# Patient Record
Sex: MALE | Race: WHITE | NOT HISPANIC OR LATINO | URBAN - METROPOLITAN AREA
[De-identification: names, ages, dates, MRNs, and addresses within clinical notes are randomized per-mention and may not be internally consistent; named-entity substitution may affect disease eponyms.]

---

## 2022-03-14 ENCOUNTER — HISTORICAL (OUTPATIENT)
Dept: ADMINISTRATIVE | Facility: HOSPITAL | Age: 37
End: 2022-03-14

## 2022-03-24 ENCOUNTER — HISTORICAL (OUTPATIENT)
Dept: SURGERY | Facility: HOSPITAL | Age: 37
End: 2022-03-24

## 2022-05-02 ENCOUNTER — OFFICE VISIT (OUTPATIENT)
Dept: ORTHOPEDICS | Facility: CLINIC | Age: 37
End: 2022-05-02

## 2022-05-02 VITALS
SYSTOLIC BLOOD PRESSURE: 132 MMHG | HEART RATE: 84 BPM | HEIGHT: 78 IN | WEIGHT: 252 LBS | BODY MASS INDEX: 29.16 KG/M2 | DIASTOLIC BLOOD PRESSURE: 81 MMHG

## 2022-05-02 DIAGNOSIS — Z98.890 S/P ARTHROSCOPY OF RIGHT SHOULDER: Primary | ICD-10-CM

## 2022-05-02 PROCEDURE — 3079F PR MOST RECENT DIASTOLIC BLOOD PRESSURE 80-89 MM HG: ICD-10-PCS | Mod: CPTII,,, | Performed by: ORTHOPAEDIC SURGERY

## 2022-05-02 PROCEDURE — 99024 PR POST-OP FOLLOW-UP VISIT: ICD-10-PCS | Mod: ,,, | Performed by: ORTHOPAEDIC SURGERY

## 2022-05-02 PROCEDURE — 3079F DIAST BP 80-89 MM HG: CPT | Mod: CPTII,,, | Performed by: ORTHOPAEDIC SURGERY

## 2022-05-02 PROCEDURE — 3008F BODY MASS INDEX DOCD: CPT | Mod: CPTII,,, | Performed by: ORTHOPAEDIC SURGERY

## 2022-05-02 PROCEDURE — 3008F PR BODY MASS INDEX (BMI) DOCUMENTED: ICD-10-PCS | Mod: CPTII,,, | Performed by: ORTHOPAEDIC SURGERY

## 2022-05-02 PROCEDURE — 99024 POSTOP FOLLOW-UP VISIT: CPT | Mod: ,,, | Performed by: ORTHOPAEDIC SURGERY

## 2022-05-02 PROCEDURE — 3075F SYST BP GE 130 - 139MM HG: CPT | Mod: CPTII,,, | Performed by: ORTHOPAEDIC SURGERY

## 2022-05-02 PROCEDURE — 3075F PR MOST RECENT SYSTOLIC BLOOD PRESS GE 130-139MM HG: ICD-10-PCS | Mod: CPTII,,, | Performed by: ORTHOPAEDIC SURGERY

## 2022-05-02 PROCEDURE — 1159F MED LIST DOCD IN RCRD: CPT | Mod: CPTII,,, | Performed by: ORTHOPAEDIC SURGERY

## 2022-05-02 PROCEDURE — 1159F PR MEDICATION LIST DOCUMENTED IN MEDICAL RECORD: ICD-10-PCS | Mod: CPTII,,, | Performed by: ORTHOPAEDIC SURGERY

## 2022-05-02 NOTE — PROGRESS NOTES
Chief Complaint:   Chief Complaint   Patient presents with    Right Shoulder - Post-op Evaluation    Shoulder Pain     7wk f/u Right shoulder arthoscopic labral repair sx 03/24/22 GL 6/22/22       History of present illness:  3/24/22:  Right shoulder arthroscopic labral repair    He returns today. Pain is under good control. Working with formal therapy. Discontinued the sling.     Musculoskeletal:   Incisions healed. Forward flexion 170, abduction 90, external rotation 60.          Assessment: S/P arthroscopy of right shoulder        Plan:  Doing well status post above.  Continue formal therapy.  Follow up in 6 weeks for recheck.

## 2022-05-14 NOTE — OP NOTE
Patient:   Torrey Bright             MRN: [id number]            FIN: [id number]               Age:   36 years     Sex:  Male     :  1985   Associated Diagnoses:   None   Author:   Elijah Pedro MD, Justin BARBOZA      SURGEON: Justin Nava MD    PREOPERATIVE DIAGNOSIS: Right shoulder labral tear  POSTOPERATIVE DIAGNOSIS: Right shoulder labral tear    PROCEDURE PERFORMED:   1. Right shoulder arthroscopic labral repair    ASSISTANT: Ronda Mcfadden NP. Mrs. Mcfadden was essential in manipulating the arm, suture anchor placement, suture shuttling, and closure    ANESTHESIA: General plus regional    ESTIMATED BLOOD LOSS: Minimal.    COMPLICATIONS: None.    IMPLANTS:    1. Arthrex FiberTak Soft [place]    INDICATIONS FOR PROCEDURE:   Alden is a 36 y.o. male who has had ongoing right shoulder pain and apprehension. The patient was seen in the clinic and preoperative imaging has revealed a torn labrum. The patient has failed nonoperative treatment and has elected for operative intervention. Risks and benefits were thoroughly explained and consent was obtained prior to today's procedure.    DESCRIPTION OF PROCEDURE:   The patient was seen in the preoperative holding area where the history and physical were reviewed without change. The operative shoulder was marked, consents were reviewed, and any questions were answered for the patient. A regional blockade of the shoulder was performed by the Anesthesia Service. The patient was induced under general anesthesia. Next the patient was placed lateral with care taken to ensure the cervical spine was well positioned and the face, eyes and all bony prominences well protected. Preoperative time-out led by the surgeon was performed verifying the patient, procedure, and preoperative antibiotics. The right upper extremity was then prepped and draped in the usual sterile fashion and secured to an arm traction post.    I then began the procedure by starting using a standard posterior portal  in its using the trocar atraumatically into the glenohumeral joint. Diagnostic arthroscopy showed intact humeral head and intact glenoid cartilage.  He had a very small Hill-Sachs lesion.  He had mild impaction of the rotator cuff but overall it was intact.  The supraspinatus, infraspinatus, subscapularis were intact. The posterior labrum was intact. The superior labrum was mildly frayed and debrided with a shaver. The anterior and inferior labrum were torn.    I then created 2 anterior portals using a spinal needle and placed 2 shuttling cannulas anteriorly. I was then able to lift off the anterior inferior labrum using an elevator. I then stimulated the glenoid bone using a shaver. Once I had the labrum appropriately mobilized, I then began the repair. I placed 1 suture anchor at the most inferior aspect of the tear. I then used a suture passer in order to settle a one limb of the suture around the torn labrum. I then used the self locking device in order to reduce the labrum back down onto the face of the glenoid. This secured the labrum and a knotless technique. I then moved superiorly along the face the glenoid and placed 3 additional anchors using a similar technique. I then probed the repair, and it was nice and stable. Happy with this, I took my final arthroscopic photos. Arthroscopic fluid was drained from the joint. Incisions were closed in layers. A sterile dressing was placed. The patient was placed into an abduction pillow sling and awoken from anesthesia. The patient was transferred to the postoperative unit in good condition

## 2022-06-13 ENCOUNTER — OFFICE VISIT (OUTPATIENT)
Dept: ORTHOPEDICS | Facility: CLINIC | Age: 37
End: 2022-06-13
Payer: COMMERCIAL

## 2022-06-13 VITALS
DIASTOLIC BLOOD PRESSURE: 81 MMHG | BODY MASS INDEX: 29.16 KG/M2 | WEIGHT: 252 LBS | HEIGHT: 78 IN | SYSTOLIC BLOOD PRESSURE: 132 MMHG

## 2022-06-13 DIAGNOSIS — Z98.890 S/P ARTHROSCOPY OF RIGHT SHOULDER: Primary | ICD-10-CM

## 2022-06-13 PROCEDURE — 1159F MED LIST DOCD IN RCRD: CPT | Mod: CPTII,,, | Performed by: NURSE PRACTITIONER

## 2022-06-13 PROCEDURE — 99024 POSTOP FOLLOW-UP VISIT: CPT | Mod: ,,, | Performed by: NURSE PRACTITIONER

## 2022-06-13 PROCEDURE — 3079F DIAST BP 80-89 MM HG: CPT | Mod: CPTII,,, | Performed by: NURSE PRACTITIONER

## 2022-06-13 PROCEDURE — 3075F PR MOST RECENT SYSTOLIC BLOOD PRESS GE 130-139MM HG: ICD-10-PCS | Mod: CPTII,,, | Performed by: NURSE PRACTITIONER

## 2022-06-13 PROCEDURE — 3008F BODY MASS INDEX DOCD: CPT | Mod: CPTII,,, | Performed by: NURSE PRACTITIONER

## 2022-06-13 PROCEDURE — 1159F PR MEDICATION LIST DOCUMENTED IN MEDICAL RECORD: ICD-10-PCS | Mod: CPTII,,, | Performed by: NURSE PRACTITIONER

## 2022-06-13 PROCEDURE — 3075F SYST BP GE 130 - 139MM HG: CPT | Mod: CPTII,,, | Performed by: NURSE PRACTITIONER

## 2022-06-13 PROCEDURE — 3079F PR MOST RECENT DIASTOLIC BLOOD PRESSURE 80-89 MM HG: ICD-10-PCS | Mod: CPTII,,, | Performed by: NURSE PRACTITIONER

## 2022-06-13 PROCEDURE — 3008F PR BODY MASS INDEX (BMI) DOCUMENTED: ICD-10-PCS | Mod: CPTII,,, | Performed by: NURSE PRACTITIONER

## 2022-06-13 PROCEDURE — 99024 PR POST-OP FOLLOW-UP VISIT: ICD-10-PCS | Mod: ,,, | Performed by: NURSE PRACTITIONER

## 2022-06-13 NOTE — PROGRESS NOTES
Chief Complaint:   Chief Complaint   Patient presents with    Right Shoulder - Follow-up    Follow-up     6wk F/U Right shoulder labral repair sx 3/24/22 GL 6/22/22, patient states hes doing great        History of present illness:  3/24/22:  Right shoulder arthroscopic labral repair     He returns today. His pain is under good control. Returning to normal activities without pain. Continues to work with therapy.      Musculoskeletal:   Forward flexion 180, abduction 100, external rotation 90.        Assessment: S/P arthroscopy of right shoulder        Plan:  Doing well s/p above. Ok to continue activities as tolerated. Ok to transition into home exercises. He can follow up if he has any issues.

## 2022-09-14 NOTE — PROCEDURES
"   Patient:   Torrey Bright             MRN: [id number]            FIN: [id number]               Age:   36 years     Sex:  Male     :  1985   Associated Diagnoses:   None   Author:   Bell STEPHENSON, Cralos A GRAFF                                                                                DEPT OF ANESTHESIOLOGY             PROCEDURE NOTE: PERIPHERAL NERVE BLOCK    start time_0806    stop time_0808  Procedure requested per _ for post op pain  Surgical procedure_Arthroscopy Shoulder W/Labral Repair (Right) "RT / ARTHREX / LATERAL / LATERAL SHOULDER TRACTION / GEN / SUPRACLAV BLOCK"  Procedure indication post op pain management       Procedure: Right_single injection       Brachial Plexus supraclavicular/RT    Time out performed  Risk benefits, alternatives, post op block activity discussed and consent obtained  patient name and ID number identified and confirmed   procedure and site id'd and confirmed     IV Sedation; IV sedation used and titrated for patient comfort, see anesthesia note      TECHNIQUE:   Aseptic skin prep with chlorhexidine/ diligent hand washing prior to procedure  Area draped with sterile towels and sterile technique used  Local anesthetic injected at the skin site of placement of the stimulating needle    Nerve stimulator used down to _0.5_mA via 50mm 20g stimuquick    (1) Under ultrasound guidance, a gauge needle was inserted and placed in close proximity to the nerve. (2) Ultrasound was also used to visualize the spread of the anesthetic in close proximity to the nerve being blocked. (3) The nerve appeared anatomically normal, and (4) there were no apparent abnormal pathological findings. (5) A permanent ultrasound image was saved in the patient's record.  ULTRASOUND GUIDANCE WAS UTILIZED UNDER DIRECT VISUALIZATION OF THE NERVE BUNDLE OR SHEATH AS WELL  TO CONFIRM PLACEMENT OF THE NEEDLE AND DEPOSITION OF THE LOCAL ANESTHETIC. LANDMARKS WERE ADEQUATELY IDENTIFIED AND INCREMENTAL " DOSES OF LOCAL ANESTHETIC INJECTED WITH FREQUENT ASPIRATION.    No pain on injection  No Parethesia on Injection    Local anesthetic used for blk_NAROPIN 0.5% to a total 25-30 ccs    Ultrasound photos archieved with medical records.  Patient tolerated procedure well without complications

## 2024-11-04 ENCOUNTER — HOSPITAL ENCOUNTER (OUTPATIENT)
Dept: RADIOLOGY | Facility: CLINIC | Age: 39
Discharge: HOME | End: 2024-11-04
Attending: ORTHOPAEDIC SURGERY
Payer: COMMERCIAL

## 2024-11-04 ENCOUNTER — OFFICE VISIT (OUTPATIENT)
Dept: ORTHOPEDICS | Facility: CLINIC | Age: 39
End: 2024-11-04
Payer: COMMERCIAL

## 2024-11-04 VITALS
WEIGHT: 264 LBS | BODY MASS INDEX: 30.55 KG/M2 | HEART RATE: 57 BPM | DIASTOLIC BLOOD PRESSURE: 76 MMHG | SYSTOLIC BLOOD PRESSURE: 117 MMHG | HEIGHT: 78 IN

## 2024-11-04 DIAGNOSIS — S93.691A RUPTURE OF PLANTAR FASCIA OF RIGHT FOOT, INITIAL ENCOUNTER: Primary | ICD-10-CM

## 2024-11-04 DIAGNOSIS — M79.671 RIGHT FOOT PAIN: ICD-10-CM

## 2024-11-04 PROCEDURE — 3078F DIAST BP <80 MM HG: CPT | Mod: CPTII,,, | Performed by: ORTHOPAEDIC SURGERY

## 2024-11-04 PROCEDURE — 99213 OFFICE O/P EST LOW 20 MIN: CPT | Mod: ,,, | Performed by: ORTHOPAEDIC SURGERY

## 2024-11-04 PROCEDURE — 3074F SYST BP LT 130 MM HG: CPT | Mod: CPTII,,, | Performed by: ORTHOPAEDIC SURGERY

## 2024-11-04 PROCEDURE — 1159F MED LIST DOCD IN RCRD: CPT | Mod: CPTII,,, | Performed by: ORTHOPAEDIC SURGERY

## 2024-11-04 PROCEDURE — 3008F BODY MASS INDEX DOCD: CPT | Mod: CPTII,,, | Performed by: ORTHOPAEDIC SURGERY

## 2024-11-04 PROCEDURE — 73630 X-RAY EXAM OF FOOT: CPT | Mod: RT,,, | Performed by: ORTHOPAEDIC SURGERY

## 2024-11-04 RX ORDER — METHIMAZOLE 5 MG/1
TABLET ORAL
COMMUNITY
Start: 2024-07-30

## 2024-11-04 RX ORDER — IBUPROFEN 800 MG/1
800 TABLET ORAL 3 TIMES DAILY
Qty: 30 TABLET | Refills: 0 | Status: SHIPPED | OUTPATIENT
Start: 2024-11-04

## 2024-11-04 RX ORDER — IBUPROFEN 800 MG/1
800 TABLET ORAL
COMMUNITY
Start: 2022-04-04 | End: 2024-11-04 | Stop reason: SDUPTHER

## 2024-11-04 RX ORDER — PERFLUOROHEXYLOCTANE 1 MG/MG
1 SOLUTION OPHTHALMIC
COMMUNITY
Start: 2024-02-07

## 2024-11-04 RX ORDER — ACETAMINOPHEN 500 MG
2 TABLET ORAL
COMMUNITY

## 2024-11-04 NOTE — PROGRESS NOTES
Chief Complaint:   Chief Complaint   Patient presents with    Right Foot - Pain     Right foot pain in the arch of the foot, bothering him for 4-5 months, on 11/2/24 felt a pop while playing tennis, no prior injections/PT, takes advil PRN with no relief, cold plunge helped       Consulting Physician: No ref. provider found    History of present illness:    he  is a pleasant 39 y.o. year old male  who has had right foot pain that began in the summer of 2024 but increased yesterday while playing tennis.  He felt a pop in the bottom of his foot.  He had pain.  He is able to finish the match.  He is using Advil intermittently.  He used a cold plans yesterday which seemed to help.  He has had a history of a high arch with orthotics going back 20 years.  He denies any new numbness or tingling    Past Medical History:   Diagnosis Date    Atrial fib/flutter, transient     Hyperthyroidism     Graves       Past Surgical History:   Procedure Laterality Date    cardio version      SHOULDER ARTHROSCOPY      SHOULDER SURGERY Right     WISDOM TOOTH EXTRACTION         Current Outpatient Medications   Medication Sig    cholecalciferol, vitamin D3, (VITAMIN D3) 50 mcg (2,000 unit) Cap capsule Take 2 tablets by mouth.    methIMAzole (TAPAZOLE) 5 MG Tab TAKE 1 TABLET BY MOUTH 5 DAYS A WEEK    perfluorohexyloctane, PF, (MIEBO, PF,) 100 % Drop Apply 1 drop to eye.    ibuprofen (ADVIL,MOTRIN) 800 MG tablet Take 1 tablet (800 mg total) by mouth 3 (three) times daily.     No current facility-administered medications for this visit.       Review of patient's allergies indicates:   Allergen Reactions    Penicillins Hives     30 years ago, as a child         No family history on file.    Social History     Socioeconomic History    Marital status:    Tobacco Use    Smoking status: Never    Smokeless tobacco: Never   Substance and Sexual Activity    Alcohol use: Yes    Drug use: Never    Sexual activity: Yes       Review of  "Systems:    Constitution:   Denies chills, fever, and sweats.  HENT:   Denies headaches or blurry vision.  Cardiovascular:  Denies chest pain or irregular heart beat.  Respiratory:   Denies cough or shortness of breath.  Gastrointestinal:  Denies abdominal pain, nausea, or vomiting.  Musculoskeletal:   Denies muscle cramps.  Neurological:   Denies dizziness or focal weakness.  Psychiatric/Behavior: Normal mental status.  Hematology/Lymph:  Denies bleeding problem or easy bruising/bleeding.  Skin:    Denies rash or suspicious lesions.    Examination:    Vital Signs:    Vitals:    11/04/24 0841   BP: 117/76   Pulse: (!) 57   Weight: 119.7 kg (264 lb)   Height: 6' 8" (2.032 m)   PainSc:   4   PainLoc: Foot       Body mass index is 29 kg/m².    Constitution:   Well-developed, well nourished patient in no acute distress.  Neurological:   Alert and oriented x 3 and cooperative to examination.     Psychiatric/Behavior: Normal mental status.  Respiratory:   No shortness of breath.  Cards:    Pulses palpable and symmetric, brisk cap refill   Eyes:    Extraoccular muscles intact  Skin:    No scars, rash or suspicious lesions.    MSK:   Focused exam over his right foot shows some tenderness over the plantar fascia.  He has minimal swelling or ecchymosis.  He has a very high arch.  He has weakness especially with a single leg heel rise.  Distally he is neurovascularly intact    Imaging: X-rays ordered and images interpreted today personally by me of three views of the right foot show normal bony alignment.         Assessment: Rupture of plantar fascia of right foot, initial encounter  -     X-Ray Foot Complete Right; Future; Expected date: 11/04/2024  -     Cancel: Ambulatory referral/consult to Physical/Occupational Therapy; Future; Expected date: 11/11/2024  -     Ambulatory referral/consult to Physical/Occupational Therapy; Future; Expected date: 11/11/2024    Other orders  -     ibuprofen (ADVIL,MOTRIN) 800 MG tablet; Take " 1 tablet (800 mg total) by mouth 3 (three) times daily.  Dispense: 30 tablet; Refill: 0        Plan:  We are going to use some anti-inflammatory medicines intermittently.  He will get him back to orthotics for new arch supports.  I think he will gradually improve over the next few weeks.  I will see him back as needed